# Patient Record
Sex: FEMALE | Race: WHITE | Employment: FULL TIME | URBAN - METROPOLITAN AREA
[De-identification: names, ages, dates, MRNs, and addresses within clinical notes are randomized per-mention and may not be internally consistent; named-entity substitution may affect disease eponyms.]

---

## 2023-05-17 ENCOUNTER — APPOINTMENT (OUTPATIENT)
Dept: GENERAL RADIOLOGY | Age: 63
End: 2023-05-17
Payer: COMMERCIAL

## 2023-05-17 ENCOUNTER — HOSPITAL ENCOUNTER (EMERGENCY)
Age: 63
Discharge: HOME | End: 2023-05-17
Attending: EMERGENCY MEDICINE
Payer: COMMERCIAL

## 2023-05-17 VITALS
DIASTOLIC BLOOD PRESSURE: 85 MMHG | WEIGHT: 141.31 LBS | SYSTOLIC BLOOD PRESSURE: 150 MMHG | HEART RATE: 67 BPM | BODY MASS INDEX: 22.71 KG/M2 | HEIGHT: 66 IN | TEMPERATURE: 97 F | OXYGEN SATURATION: 99 % | RESPIRATION RATE: 16 BRPM

## 2023-05-17 DIAGNOSIS — S99.912A ANKLE INJURY, LEFT, INITIAL ENCOUNTER: Primary | ICD-10-CM

## 2023-05-17 PROCEDURE — 73610 X-RAY EXAM OF ANKLE: CPT

## 2023-05-17 PROCEDURE — 73630 X-RAY EXAM OF FOOT: CPT

## 2023-05-17 PROCEDURE — 6370000000 HC RX 637 (ALT 250 FOR IP): Performed by: EMERGENCY MEDICINE

## 2023-05-17 PROCEDURE — 99283 EMERGENCY DEPT VISIT LOW MDM: CPT

## 2023-05-17 RX ORDER — ACETAMINOPHEN 325 MG/1
650 TABLET ORAL ONCE
Status: COMPLETED | OUTPATIENT
Start: 2023-05-17 | End: 2023-05-17

## 2023-05-17 RX ORDER — IBUPROFEN 600 MG/1
600 TABLET ORAL ONCE
Status: COMPLETED | OUTPATIENT
Start: 2023-05-17 | End: 2023-05-17

## 2023-05-17 RX ADMIN — ACETAMINOPHEN 650 MG: 325 TABLET ORAL at 14:23

## 2023-05-17 RX ADMIN — IBUPROFEN 600 MG: 600 TABLET, FILM COATED ORAL at 14:23

## 2023-05-17 ASSESSMENT — PAIN SCALES - GENERAL
PAINLEVEL_OUTOF10: 3
PAINLEVEL_OUTOF10: 5

## 2023-05-17 ASSESSMENT — ENCOUNTER SYMPTOMS
NAUSEA: 0
SHORTNESS OF BREATH: 0
DIARRHEA: 0
TROUBLE SWALLOWING: 0
VOMITING: 0
VOICE CHANGE: 0

## 2023-05-17 ASSESSMENT — PAIN DESCRIPTION - LOCATION
LOCATION: ANKLE;FOOT
LOCATION: ANKLE

## 2023-05-17 ASSESSMENT — PAIN DESCRIPTION - DESCRIPTORS
DESCRIPTORS: ACHING
DESCRIPTORS: ACHING

## 2023-05-17 ASSESSMENT — PAIN - FUNCTIONAL ASSESSMENT
PAIN_FUNCTIONAL_ASSESSMENT: 0-10
PAIN_FUNCTIONAL_ASSESSMENT: 0-10

## 2023-05-17 ASSESSMENT — PAIN DESCRIPTION - FREQUENCY: FREQUENCY: CONTINUOUS

## 2023-05-17 ASSESSMENT — PAIN DESCRIPTION - PAIN TYPE: TYPE: ACUTE PAIN

## 2023-05-17 ASSESSMENT — PAIN DESCRIPTION - ORIENTATION
ORIENTATION: LEFT
ORIENTATION: LEFT

## 2023-05-17 NOTE — ED NOTES
C/o pain left ankle/foot with swelling.  Placed ice pack on left ankle      Dez George RN  05/17/23 1707

## 2023-05-17 NOTE — ED PROVIDER NOTES
[facility]  eMERGENCY dEPARTMENT eNCOUnter      Pt Name: Sonia Bullard  MRN: [id number]  [place] 1960  Date of evaluation: 5/17/2023  Provider: Bree Mayer MD    CHIEF COMPLAINT       Chief Complaint   Patient presents with    Ankle Pain     She was sitting at her desk. She stood up and her foot was asleep. She twisted left ankle and heard a pop. (WORKER'S COMP)         HISTORY OF PRESENT ILLNESS   (Location/Symptom, Timing/Onset, Context/Setting, Quality, Duration, Modifying Factors, Severity)  Note limiting factors. History obtained from: the patient     Sonia Bullard is a 61 y.o. female who presents with left ankle and foot pain. Patient reports she was sitting at her desk when she got up her foot felt asleep due to prolonged sitting and this caused her to misstep and twisted her ankle and she heard a pop. She reports swelling to her left lateral ankle. Reports this happened shortly prior to coming to the ED. She reports she did lower self to the ground denies any heart fall and denies any head neck or back pain or injury. Reports pain is to her left foot and left ankle. Reports pain is moderate constant and unchanged. Reports attempting to bear weight worsens the pain and rest improves it. HPI    Nursing Notes were reviewed. REVIEW OFSYSTEMS    (2-9 systems for level 4, 10 or more for level 5)     Review of Systems   Constitutional:  Negative for appetite change and fever. HENT:  Negative for trouble swallowing and voice change. Eyes:  Negative for visual disturbance. Respiratory:  Negative for shortness of breath. Cardiovascular:  Negative for chest pain and palpitations. Gastrointestinal:  Negative for diarrhea, nausea and vomiting. Genitourinary:  Negative for dysuria. Musculoskeletal:  Positive for arthralgias and gait problem. Neurological:  Negative for seizures and syncope.    Psychiatric/Behavioral:  Negative for self-injury and

## 2023-05-17 NOTE — ED NOTES
Patient ambulated on crutches to bathroom and back to room. Gait steady on crutches. Offered w/c for discharge patient /daughter want patient to go out on crutches. Discharge and education instructions reviewed with patient. Teach-back successful. Pt verbalized understanding and signed d/c papers. Pt denied questions at this time. No acute distress noted. Patient instructed to follow-up as noted - return to emergency department if symptoms worsen. Patient verbalized understanding. Discharged per EDMD with discharge instructions. Pt discharged to private vehicle. Patient stable upon departure. Thanked patient for choosing [facility]) for care.          Tristan Riddle RN  05/17/23 [address] Prasanth Viveros, RICARDO  05/17/23 [id number]